# Patient Record
Sex: FEMALE | Race: WHITE | NOT HISPANIC OR LATINO | Employment: OTHER | URBAN - NONMETROPOLITAN AREA
[De-identification: names, ages, dates, MRNs, and addresses within clinical notes are randomized per-mention and may not be internally consistent; named-entity substitution may affect disease eponyms.]

---

## 2017-04-05 ENCOUNTER — OFFICE VISIT (OUTPATIENT)
Dept: DERMATOLOGY | Age: 82
End: 2017-04-05

## 2017-04-05 DIAGNOSIS — L82.1 SK (SEBORRHEIC KERATOSIS): Primary | ICD-10-CM

## 2017-04-05 PROCEDURE — 99213 OFFICE O/P EST LOW 20 MIN: CPT | Performed by: DERMATOLOGY

## 2017-04-05 RX ORDER — LACTOBACILLUS ACIDOPHILUS/PECT 30 MG-20MG
TABLET ORAL
COMMUNITY

## 2017-04-15 ENCOUNTER — RECORDS - HEALTHEAST (OUTPATIENT)
Dept: ADMINISTRATIVE | Facility: OTHER | Age: 82
End: 2017-04-15

## 2017-04-16 ENCOUNTER — RECORDS - HEALTHEAST (OUTPATIENT)
Dept: ADMINISTRATIVE | Facility: OTHER | Age: 82
End: 2017-04-16

## 2017-04-18 ENCOUNTER — RECORDS - HEALTHEAST (OUTPATIENT)
Dept: ADMINISTRATIVE | Facility: OTHER | Age: 82
End: 2017-04-18

## 2017-04-26 ENCOUNTER — RECORDS - HEALTHEAST (OUTPATIENT)
Dept: ADMINISTRATIVE | Facility: OTHER | Age: 82
End: 2017-04-26

## 2017-04-28 ENCOUNTER — RECORDS - HEALTHEAST (OUTPATIENT)
Dept: ADMINISTRATIVE | Facility: OTHER | Age: 82
End: 2017-04-28

## 2017-05-01 ENCOUNTER — RECORDS - HEALTHEAST (OUTPATIENT)
Dept: ADMINISTRATIVE | Facility: OTHER | Age: 82
End: 2017-05-01

## 2017-05-03 ENCOUNTER — RECORDS - HEALTHEAST (OUTPATIENT)
Dept: ADMINISTRATIVE | Facility: OTHER | Age: 82
End: 2017-05-03

## 2017-05-05 ENCOUNTER — OFFICE VISIT - HEALTHEAST (OUTPATIENT)
Dept: FAMILY MEDICINE | Facility: CLINIC | Age: 82
End: 2017-05-05

## 2017-05-05 ENCOUNTER — COMMUNICATION - HEALTHEAST (OUTPATIENT)
Dept: NURSING | Facility: CLINIC | Age: 82
End: 2017-05-05

## 2017-05-05 DIAGNOSIS — Z09 FOLLOW UP: ICD-10-CM

## 2017-05-05 DIAGNOSIS — I26.99 PULMONARY EMBOLISM (H): ICD-10-CM

## 2017-05-05 DIAGNOSIS — R60.9 EDEMA: ICD-10-CM

## 2017-05-05 DIAGNOSIS — K21.9 GERD (GASTROESOPHAGEAL REFLUX DISEASE): ICD-10-CM

## 2017-05-05 DIAGNOSIS — D47.Z2 CASTLEMAN DISEASE (H): ICD-10-CM

## 2017-05-05 DIAGNOSIS — E55.9 VITAMIN D DEFICIENCY: ICD-10-CM

## 2017-05-05 DIAGNOSIS — E03.9 HYPOTHYROIDISM: ICD-10-CM

## 2017-05-05 DIAGNOSIS — E78.5 HYPERLIPIDEMIA: ICD-10-CM

## 2017-05-05 DIAGNOSIS — I10 HYPERTENSION: ICD-10-CM

## 2017-05-05 DIAGNOSIS — A41.9 SEPSIS (H): ICD-10-CM

## 2017-05-05 DIAGNOSIS — M79.674 GREAT TOE PAIN, RIGHT: ICD-10-CM

## 2017-05-05 RX ORDER — LEVOTHYROXINE SODIUM 112 UG/1
225 TABLET ORAL DAILY
Status: SHIPPED | COMMUNITY
Start: 2017-05-05

## 2017-05-05 RX ORDER — FUROSEMIDE 40 MG
40 TABLET ORAL
Status: SHIPPED | COMMUNITY
Start: 2017-05-05

## 2017-05-05 RX ORDER — WARFARIN SODIUM 1 MG/1
3 TABLET ORAL SEE ADMIN INSTRUCTIONS
Status: SHIPPED | COMMUNITY
Start: 2017-05-05

## 2017-05-05 RX ORDER — CARVEDILOL 6.25 MG/1
3.12 TABLET ORAL 2 TIMES DAILY
Status: SHIPPED | COMMUNITY
Start: 2017-05-05

## 2017-05-05 RX ORDER — ACETAMINOPHEN 500 MG
1000 TABLET ORAL EVERY EVENING
Status: SHIPPED | COMMUNITY
Start: 2017-05-05

## 2017-05-08 ENCOUNTER — COMMUNICATION - HEALTHEAST (OUTPATIENT)
Dept: FAMILY MEDICINE | Facility: CLINIC | Age: 82
End: 2017-05-08

## 2017-05-08 DIAGNOSIS — I26.99 PULMONARY EMBOLISM (H): ICD-10-CM

## 2017-05-09 ENCOUNTER — COMMUNICATION - HEALTHEAST (OUTPATIENT)
Dept: FAMILY MEDICINE | Facility: CLINIC | Age: 82
End: 2017-05-09

## 2017-05-10 ENCOUNTER — RECORDS - HEALTHEAST (OUTPATIENT)
Dept: ADMINISTRATIVE | Facility: OTHER | Age: 82
End: 2017-05-10

## 2017-05-10 ENCOUNTER — COMMUNICATION - HEALTHEAST (OUTPATIENT)
Dept: FAMILY MEDICINE | Facility: CLINIC | Age: 82
End: 2017-05-10

## 2017-05-11 ENCOUNTER — OFFICE VISIT - HEALTHEAST (OUTPATIENT)
Dept: FAMILY MEDICINE | Facility: CLINIC | Age: 82
End: 2017-05-11

## 2017-05-11 ENCOUNTER — COMMUNICATION - HEALTHEAST (OUTPATIENT)
Dept: FAMILY MEDICINE | Facility: CLINIC | Age: 82
End: 2017-05-11

## 2017-05-11 ENCOUNTER — COMMUNICATION - HEALTHEAST (OUTPATIENT)
Dept: INTERNAL MEDICINE | Facility: CLINIC | Age: 82
End: 2017-05-11

## 2017-05-11 DIAGNOSIS — I26.99 PULMONARY EMBOLISM (H): ICD-10-CM

## 2017-05-12 ENCOUNTER — COMMUNICATION - HEALTHEAST (OUTPATIENT)
Dept: FAMILY MEDICINE | Facility: CLINIC | Age: 82
End: 2017-05-12

## 2017-05-16 ENCOUNTER — AMBULATORY - HEALTHEAST (OUTPATIENT)
Dept: FAMILY MEDICINE | Facility: CLINIC | Age: 82
End: 2017-05-16

## 2017-05-16 ENCOUNTER — RECORDS - HEALTHEAST (OUTPATIENT)
Dept: ADMINISTRATIVE | Facility: OTHER | Age: 82
End: 2017-05-16

## 2017-05-16 ENCOUNTER — COMMUNICATION - HEALTHEAST (OUTPATIENT)
Dept: FAMILY MEDICINE | Facility: CLINIC | Age: 82
End: 2017-05-16

## 2017-05-16 DIAGNOSIS — R60.9 EDEMA: ICD-10-CM

## 2017-05-22 ENCOUNTER — COMMUNICATION - HEALTHEAST (OUTPATIENT)
Dept: FAMILY MEDICINE | Facility: CLINIC | Age: 82
End: 2017-05-22

## 2017-05-22 ENCOUNTER — RECORDS - HEALTHEAST (OUTPATIENT)
Dept: ADMINISTRATIVE | Facility: OTHER | Age: 82
End: 2017-05-22

## 2017-05-24 ENCOUNTER — RECORDS - HEALTHEAST (OUTPATIENT)
Dept: ADMINISTRATIVE | Facility: OTHER | Age: 82
End: 2017-05-24

## 2017-05-31 ENCOUNTER — COMMUNICATION - HEALTHEAST (OUTPATIENT)
Dept: FAMILY MEDICINE | Facility: CLINIC | Age: 82
End: 2017-05-31

## 2017-05-31 ENCOUNTER — COMMUNICATION - HEALTHEAST (OUTPATIENT)
Dept: INTERNAL MEDICINE | Facility: CLINIC | Age: 82
End: 2017-05-31

## 2017-06-01 ENCOUNTER — AMBULATORY - HEALTHEAST (OUTPATIENT)
Dept: FAMILY MEDICINE | Facility: CLINIC | Age: 82
End: 2017-06-01

## 2017-06-02 ENCOUNTER — RECORDS - HEALTHEAST (OUTPATIENT)
Dept: ADMINISTRATIVE | Facility: OTHER | Age: 82
End: 2017-06-02

## 2017-06-23 ENCOUNTER — COMMUNICATION - HEALTHEAST (OUTPATIENT)
Dept: NURSING | Facility: CLINIC | Age: 82
End: 2017-06-23

## 2017-06-23 DIAGNOSIS — I26.99 PULMONARY EMBOLISM (H): ICD-10-CM

## 2017-09-19 ENCOUNTER — OFFICE VISIT (OUTPATIENT)
Dept: DERMATOLOGY | Age: 82
End: 2017-09-19

## 2017-09-19 DIAGNOSIS — L57.0 AK (ACTINIC KERATOSIS): Primary | ICD-10-CM

## 2017-09-19 PROCEDURE — 17000 DESTRUCT PREMALG LESION: CPT | Performed by: DERMATOLOGY

## 2017-10-19 ENCOUNTER — OFFICE VISIT (OUTPATIENT)
Dept: DERMATOLOGY | Age: 82
End: 2017-10-19

## 2017-10-19 DIAGNOSIS — L57.0 AK (ACTINIC KERATOSIS): Primary | ICD-10-CM

## 2017-10-19 PROCEDURE — 17003 DESTRUCT PREMALG LES 2-14: CPT | Performed by: DERMATOLOGY

## 2017-10-19 PROCEDURE — 17000 DESTRUCT PREMALG LESION: CPT | Performed by: DERMATOLOGY

## 2017-12-01 ENCOUNTER — RECORDS - HEALTHEAST (OUTPATIENT)
Dept: ADMINISTRATIVE | Facility: OTHER | Age: 82
End: 2017-12-01

## 2017-12-04 ENCOUNTER — RECORDS - HEALTHEAST (OUTPATIENT)
Dept: ADMINISTRATIVE | Facility: OTHER | Age: 82
End: 2017-12-04

## 2017-12-05 ENCOUNTER — COMMUNICATION - HEALTHEAST (OUTPATIENT)
Dept: LAB | Facility: CLINIC | Age: 82
End: 2017-12-05

## 2017-12-05 ENCOUNTER — RECORDS - HEALTHEAST (OUTPATIENT)
Dept: ADMINISTRATIVE | Facility: OTHER | Age: 82
End: 2017-12-05

## 2017-12-05 DIAGNOSIS — I26.99 PULMONARY EMBOLISM (H): ICD-10-CM

## 2017-12-06 ENCOUNTER — RECORDS - HEALTHEAST (OUTPATIENT)
Dept: ADMINISTRATIVE | Facility: OTHER | Age: 82
End: 2017-12-06

## 2017-12-08 ENCOUNTER — COMMUNICATION - HEALTHEAST (OUTPATIENT)
Dept: NURSING | Facility: CLINIC | Age: 82
End: 2017-12-08

## 2017-12-08 ENCOUNTER — AMBULATORY - HEALTHEAST (OUTPATIENT)
Dept: LAB | Facility: CLINIC | Age: 82
End: 2017-12-08

## 2017-12-08 DIAGNOSIS — I26.99 PULMONARY EMBOLISM (H): ICD-10-CM

## 2017-12-11 ENCOUNTER — RECORDS - HEALTHEAST (OUTPATIENT)
Dept: ADMINISTRATIVE | Facility: OTHER | Age: 82
End: 2017-12-11

## 2017-12-14 ENCOUNTER — COMMUNICATION - HEALTHEAST (OUTPATIENT)
Dept: INTERNAL MEDICINE | Facility: CLINIC | Age: 82
End: 2017-12-14

## 2017-12-14 ENCOUNTER — OFFICE VISIT - HEALTHEAST (OUTPATIENT)
Dept: FAMILY MEDICINE | Facility: CLINIC | Age: 82
End: 2017-12-14

## 2017-12-14 DIAGNOSIS — B33.8 RESPIRATORY SYNCYTIAL VIRUS: ICD-10-CM

## 2017-12-14 DIAGNOSIS — I26.99 PULMONARY EMBOLISM (H): ICD-10-CM

## 2017-12-14 DIAGNOSIS — E78.5 HYPERLIPIDEMIA: ICD-10-CM

## 2017-12-14 DIAGNOSIS — Z09 FOLLOW UP: ICD-10-CM

## 2017-12-14 RX ORDER — SIMVASTATIN 10 MG
10 TABLET ORAL AT BEDTIME
Qty: 30 TABLET | Refills: 0 | Status: SHIPPED | OUTPATIENT
Start: 2017-12-14

## 2017-12-15 ENCOUNTER — COMMUNICATION - HEALTHEAST (OUTPATIENT)
Dept: FAMILY MEDICINE | Facility: CLINIC | Age: 82
End: 2017-12-15

## 2017-12-18 ENCOUNTER — COMMUNICATION - HEALTHEAST (OUTPATIENT)
Dept: LAB | Facility: CLINIC | Age: 82
End: 2017-12-18

## 2017-12-18 DIAGNOSIS — I26.99 PULMONARY EMBOLISM (H): ICD-10-CM

## 2017-12-22 ENCOUNTER — AMBULATORY - HEALTHEAST (OUTPATIENT)
Dept: LAB | Facility: CLINIC | Age: 82
End: 2017-12-22

## 2017-12-22 ENCOUNTER — COMMUNICATION - HEALTHEAST (OUTPATIENT)
Dept: INTERNAL MEDICINE | Facility: CLINIC | Age: 82
End: 2017-12-22

## 2017-12-22 DIAGNOSIS — I26.99 PULMONARY EMBOLISM (H): ICD-10-CM

## 2018-01-11 ENCOUNTER — COMMUNICATION - HEALTHEAST (OUTPATIENT)
Dept: INTERNAL MEDICINE | Facility: CLINIC | Age: 83
End: 2018-01-11

## 2018-01-11 DIAGNOSIS — I26.99 PULMONARY EMBOLISM (H): ICD-10-CM

## 2018-03-03 ENCOUNTER — RECORDS - HEALTHEAST (OUTPATIENT)
Dept: ADMINISTRATIVE | Facility: OTHER | Age: 83
End: 2018-03-03

## 2018-03-06 ENCOUNTER — COMMUNICATION - HEALTHEAST (OUTPATIENT)
Dept: LAB | Facility: CLINIC | Age: 83
End: 2018-03-06

## 2018-03-07 ENCOUNTER — AMBULATORY - HEALTHEAST (OUTPATIENT)
Dept: LAB | Facility: CLINIC | Age: 83
End: 2018-03-07

## 2018-03-07 ENCOUNTER — OFFICE VISIT - HEALTHEAST (OUTPATIENT)
Dept: FAMILY MEDICINE | Facility: CLINIC | Age: 83
End: 2018-03-07

## 2018-03-07 DIAGNOSIS — R35.0 URINE FREQUENCY: ICD-10-CM

## 2018-03-07 DIAGNOSIS — I26.99 PULMONARY EMBOLISM (H): ICD-10-CM

## 2018-03-07 DIAGNOSIS — I10 ESSENTIAL HYPERTENSION: ICD-10-CM

## 2018-03-07 DIAGNOSIS — Z09 FOLLOW UP: ICD-10-CM

## 2018-03-07 DIAGNOSIS — R53.82 CHRONIC FATIGUE: ICD-10-CM

## 2018-03-07 DIAGNOSIS — N39.0 UTI (URINARY TRACT INFECTION): ICD-10-CM

## 2018-03-07 DIAGNOSIS — K21.9 GASTROESOPHAGEAL REFLUX DISEASE WITHOUT ESOPHAGITIS: ICD-10-CM

## 2018-03-07 LAB
ALBUMIN SERPL-MCNC: 3.1 G/DL (ref 3.5–5)
ALBUMIN UR-MCNC: NEGATIVE MG/DL
ALP SERPL-CCNC: 73 U/L (ref 45–120)
ALT SERPL W P-5'-P-CCNC: 20 U/L (ref 0–45)
ANION GAP SERPL CALCULATED.3IONS-SCNC: 8 MMOL/L (ref 5–18)
APPEARANCE UR: CLEAR
AST SERPL W P-5'-P-CCNC: 25 U/L (ref 0–40)
BACTERIA #/AREA URNS HPF: ABNORMAL HPF
BASOPHILS # BLD AUTO: 0.1 THOU/UL (ref 0–0.2)
BASOPHILS NFR BLD AUTO: 1 % (ref 0–2)
BILIRUB SERPL-MCNC: 0.5 MG/DL (ref 0–1)
BILIRUB UR QL STRIP: NEGATIVE
BUN SERPL-MCNC: 19 MG/DL (ref 8–28)
CALCIUM SERPL-MCNC: 9.4 MG/DL (ref 8.5–10.5)
CHLORIDE BLD-SCNC: 100 MMOL/L (ref 98–107)
CO2 SERPL-SCNC: 29 MMOL/L (ref 22–31)
COLOR UR AUTO: YELLOW
CREAT SERPL-MCNC: 0.82 MG/DL (ref 0.6–1.1)
EOSINOPHIL # BLD AUTO: 0.4 THOU/UL (ref 0–0.4)
EOSINOPHIL NFR BLD AUTO: 4 % (ref 0–6)
ERYTHROCYTE [DISTWIDTH] IN BLOOD BY AUTOMATED COUNT: 12.3 % (ref 11–14.5)
GFR SERPL CREATININE-BSD FRML MDRD: >60 ML/MIN/1.73M2
GLUCOSE BLD-MCNC: 90 MG/DL (ref 70–125)
GLUCOSE UR STRIP-MCNC: NEGATIVE MG/DL
HCT VFR BLD AUTO: 31.9 % (ref 35–47)
HGB BLD-MCNC: 11 G/DL (ref 12–16)
HGB UR QL STRIP: NEGATIVE
INR PPP: 2.6 (ref 0.9–1.1)
KETONES UR STRIP-MCNC: NEGATIVE MG/DL
LEUKOCYTE ESTERASE UR QL STRIP: ABNORMAL
LYMPHOCYTES # BLD AUTO: 2.8 THOU/UL (ref 0.8–4.4)
LYMPHOCYTES NFR BLD AUTO: 30 % (ref 20–40)
MCH RBC QN AUTO: 29.6 PG (ref 27–34)
MCHC RBC AUTO-ENTMCNC: 34.4 G/DL (ref 32–36)
MCV RBC AUTO: 86 FL (ref 80–100)
MONOCYTES # BLD AUTO: 0.9 THOU/UL (ref 0–0.9)
MONOCYTES NFR BLD AUTO: 10 % (ref 2–10)
NEUTROPHILS # BLD AUTO: 5.3 THOU/UL (ref 2–7.7)
NEUTROPHILS NFR BLD AUTO: 56 % (ref 50–70)
NITRATE UR QL: NEGATIVE
PH UR STRIP: 6 [PH] (ref 5–8)
PLATELET # BLD AUTO: 533 THOU/UL (ref 140–440)
PMV BLD AUTO: 6.8 FL (ref 7–10)
POTASSIUM BLD-SCNC: 4.4 MMOL/L (ref 3.5–5)
PROT SERPL-MCNC: 6.3 G/DL (ref 6–8)
RBC # BLD AUTO: 3.7 MILL/UL (ref 3.8–5.4)
RBC #/AREA URNS AUTO: ABNORMAL HPF
SODIUM SERPL-SCNC: 137 MMOL/L (ref 136–145)
SP GR UR STRIP: 1.01 (ref 1–1.03)
SQUAMOUS #/AREA URNS AUTO: ABNORMAL LPF
TSH SERPL DL<=0.005 MIU/L-ACNC: 9.8 UIU/ML (ref 0.3–5)
UROBILINOGEN UR STRIP-ACNC: ABNORMAL
WBC #/AREA URNS AUTO: ABNORMAL HPF
WBC: 9.5 THOU/UL (ref 4–11)

## 2018-03-07 ASSESSMENT — MIFFLIN-ST. JEOR: SCORE: 1086.76

## 2018-03-08 ENCOUNTER — COMMUNICATION - HEALTHEAST (OUTPATIENT)
Dept: FAMILY MEDICINE | Facility: CLINIC | Age: 83
End: 2018-03-08

## 2018-03-10 LAB — BACTERIA SPEC CULT: ABNORMAL

## 2018-03-12 ENCOUNTER — COMMUNICATION - HEALTHEAST (OUTPATIENT)
Dept: PEDIATRICS | Facility: CLINIC | Age: 83
End: 2018-03-12

## 2021-05-30 VITALS — WEIGHT: 156.6 LBS

## 2021-05-31 VITALS — WEIGHT: 155.4 LBS

## 2021-05-31 VITALS — WEIGHT: 150.7 LBS

## 2021-06-01 VITALS — BODY MASS INDEX: 28.95 KG/M2 | WEIGHT: 157.3 LBS | HEIGHT: 62 IN

## 2021-06-10 NOTE — PROGRESS NOTES
"  Subjective:      Kayla Agarwal is a 86 y.o. female who presents today with  and son in law for follow up, INR check  She does have history of Hypertension, Pulmonary Embolism, Hypothyroidism, Castleman's disease, unicentric, treated surgical 2016.   Notes she did have a PET scan last week and was normal.     Was seen in the ED at [facility] on 4/14/2017 for fever, weakness, confusion and was diagnosed with Sepsis - likely secondary to Group B strep, pharyngitis.    She was admitted at [facility], Medical ICU   And stayed there for 30  Hours in the ICU and transferred to a medical floor - was at the [facility] for overall one week. She was discharged from [facility] on 4/22/216 to [place], for rehabiliation.   She has been there for two weeks tomorrow. She will be discharged to [facility], [facility] in [place] tomorrow.    \She is from [place], [place] - [facility] if she can get home.    She needs a local provider if and until she can get home.   Has a follow up with [facility] 6/5, 6/6 and 6/7 - Internal Medicine, Hematology, ENT for CT for neck - enlarged lymph nodes\" re-examine off of abx. Ultrasound of spleen, PET scan showed enlarged spleen, u/s of abdomen -   All these tests are coming up.    She is also being followed by [facility] for weight loss, endorsed 40 pound weight loss over past year and not intentional  Noted history of hypertension, hyperlipidemia, PE, Hypothyroidism, GERD, Edema.     For medications she is taking,   Tylenol at night,     Coreg, for hypertension, well controlled   Denies side effects.   Lasix 20mg on AM on T, R, Sa, Sun and 40mg on MWF  Synthroid 225mcg, daily - unsure last thyroid check 3/3017  She is taking Prilosec 20mg - heart kenia, taken this for years and years.   Simvastin for hyperlipidemia - She is followed by Cardiology in [place], [place]    Taking Melatonin at night before bed  Vitamin D3 2,000iu once per day      Toe pain improved.   Presents today for INR " check for history of PE, chronic management  She states that they did complete an xray in the rehabilitation center and was normal.        Reviewed past medical/surgical history, family history, social history, medications and updated chart below.     No Known Allergies  No past medical history on file.  No past surgical history on file.  Social History     Social History     Marital status:      Spouse name: N/A     Number of children: N/A     Years of education: N/A     Occupational History     Not on file.     Social History Main Topics     Smoking status: Never Smoker     Smokeless tobacco: Never Used     Alcohol use Not on file     Drug use: Not on file     Sexual activity: Not on file     Other Topics Concern     Not on file     Social History Narrative     No family history on file.  Current Outpatient Prescriptions   Medication Sig Dispense Refill     carvedilol (COREG) 6.25 MG tablet Take 3.125 mg by mouth 2 (two) times a day.       furosemide (LASIX) 40 MG tablet Take 40 mg by mouth. Lasix 20 mg every morning on Tue, Thurs, Sat and Sunday. Lasix 40 mg on Monday, Wed and Friday.       levothyroxine (SYNTHROID, LEVOTHROID) 112 MCG tablet Take 225 mcg by mouth daily.       melatonin 1 mg Tab tablet Take 1 mg by mouth at bedtime as needed.       omeprazole (PRILOSEC) 20 MG capsule Take 20 mg by mouth Daily before breakfast.       simvastatin (ZOCOR) 10 MG tablet Take 10 mg by mouth at bedtime.       warfarin (COUMADIN) 1 MG tablet Take 1 mg by mouth See Admin Instructions. Coumadin 1 mg on Monday, Tuesday and Wednesday. Coumadin 1.5 mg on Saturday and Sunday.  Adjust dose based on INR results as directed.       acetaminophen (TYLENOL) 500 MG tablet Take 1,000 mg by mouth every evening.       cholecalciferol, vitamin D3, (VITAMIN D3) 2,000 unit cap Take 1 capsule by mouth daily.       cyanocobalamin 1000 MCG tablet Take 1,000 mcg by mouth daily.       No current facility-administered medications for this  visit.      Patient Active Problem List    Diagnosis Date Noted     Pulmonary embolism 05/05/2017     Hypertension 05/05/2017     Sepsis 05/05/2017     Hypothyroidism 05/05/2017     GERD (gastroesophageal reflux disease) 05/05/2017     Hyperlipidemia 05/05/2017     Edema 05/05/2017     Castleman disease 05/05/2017       Review of Systems   Constitutional: Negative.   HENT: Negative.   Eyes: Negative.   Respiratory: Negative.   Cardiovascular: Negative.   Gastrointestinal: Negative.   Endocrine: Negative.   Genitourinary: Negative.   Musculoskeletal: Negative.   Skin: Great toe red and painful  Allergic/Immunologic: Negative.   Neurological: Negative.   Hematological: Negative.   Psychiatric/Behavioral: Negative.     Objective:    Physical Exam   /72 (Patient Site: Left Arm, Patient Position: Sitting, Cuff Size: Adult Regular)  Wt 155 lb 6.4 oz (70.5 kg)    Constitutional: Alert and oriented x3, well nourished without any acute distress  Eyes: Conjunctivae and EOM are normal. Pupils are equal, round, and reactive to light. Right eye exhibits no discharge. Left eye exhibits no discharge.   Neck: No thyromegaly present.   Lymphadenopathy: Without palpable lymphadenopathy  Cardiovascular: Normal S1 and S2. Regular rate, rhythm and no murmur, rubs or gallops. Palpable distal pulses bilaterally.  Pulmonary/Chest: Normal effort. Lungs clear to auscultation in all lobes. Without wheezing, rhonchi or crackles.    Abdominal: Soft, non tenderness. There is no rebound or guarding. Bowel sounds x4. Without organomegaly. No CVA tenderness.  Musculoskeletal: 5/5 strength  And full ROM in all extremities. No joint swelling or deformity  Skin: Dry and intact; without rashes or lesions.  Left great toe erythematous  Psychiatric: Normal mood and affect.           Assessment/Plan:         Pulmonary embolism  History of PE, continue with INR Anticoagulation team.   Will follow up and adjust once lab received.   Reinforced side  effects of medications and proper use. Patient verbalized understanding.      Hypertension  Blood pressure well controlled.   Continue Coreg 6.25  Discussed parameters today and when to follow up.     Vitals:    05/11/17 1048   BP: 138/72       Follow up  Sepsis  I did review records from recent hospitalization and current rehabilitation center.   Followed up today with recent labs checked.   Continue with home PT/OT  Continue with already scheduled follow up appointments at [facility] for further imaging, testing.   Discussed red flags that would warrant urgent evaluation. Patient verbalized understanding.  Patient agreed and appeared pleased with plan    - Comprehensive Metabolic Panel  - HM1(CBC and Differential)  - HM1 (CBC with Diff)    Hypothyroidism  Recent thyroid labs checked during hospitalization.   Continue synthroid 112mcg.   Discussed with patient to follow up if worsening symptoms, questions or concerns  Patient agreed and appeared pleased with plan      GERD (gastroesophageal reflux disease)  Continue with PPI as already prescribed.     Hyperlipidemia  Continue with stable medication, Statin - Zocor 10mg.   Reinforced mark effects of medications and proper use. Patient verbalized understanding.  Patient agreed and appeared pleased with plan      Edema  Continue with Furosemide as already prescribed, recent normal CMP  Continue with compression stockings.     Castleman disease  Continue management at [facility]  Discussed with patient to follow up if worsening symptoms, questions or concerns  Patient agreed and appeared pleased with plan       Vitamin D deficiency  Will check level today, continue with 2,000iu of Vitamin D3 once per day.  Recent normal vitamin D level      Great toe pain, right  Improved! Follow up for persistent, worsening symptoms.   Kayla agreed with plan.     Weight loss,  Continue workup at [facility]  Will continue to log weight, diet.     She does plan to go back home to  [place], I did recommend follow up in 4 weeks if she is still living at her daughters home or sooner if needed  Patient and family agreed with plan.     Michelle Howard CNP  5/11/2017

## 2021-06-14 NOTE — PROGRESS NOTES
"  Subjective:      Kayla Agarwal is a 87 y.o. female who presents today with  and daughter, Concha, for follow up, INR check  She does have history of Hypertension, Pulmonary Embolism, Hypothyroidism, Castleman's disease, treated surgical 2016.   She lives and is from [place], [place]. Daughter states she did have cold symptoms and was not getting improved with treatment by her PCP in [place].   Was brought to [facility] on 12/4/2017 for evaluation and was admitted for two nights.   Discharged on 12/6/2017 with diagnosis of  RSV. She was discharged home with her daughter in [place].Daughter states she will be living with her for a couple months before she transitions home to [place] and her previous PCP.     She presents today for INR check, and management while she is living in [place].   Today Kayla states she feels \"fine\" She is using albuterol only when needed and tessalon perles while she continues to recover from her recent diagnosis of RSV.   She was discharged with steroids/prednisone and this is complete. Denies any chest pain, wheezing, shortness of breath, difficulty breathing.    Noted history of hypertension, hyperlipidemia, PE, Hypothyroidism, GERD, Edema.   Feels her medical history of stable. She is requesting a refill of her statin medication for one month today.   She is followed by Cardiologist in [place], [place].     Coreg, for hypertension, well controlled   Denies side effects.   Lasix 20mg on AM on T, R, Sa, Sun and 40mg on MWF  Synthroid 225mcg, daily - feels stable  She is taking Prilosec 20mg - heart kenia, taken this for years and years.       Reviewed past medical/surgical history, family history, social history, medications and updated chart below.     No Known Allergies  No past medical history on file.  No past surgical history on file.  Social History     Social History     Marital status:      Spouse name: N/A     Number of children: N/A     Years of education: N/A "     Occupational History     Not on file.     Social History Main Topics     Smoking status: Never Smoker     Smokeless tobacco: Never Used     Alcohol use Not on file     Drug use: Not on file     Sexual activity: Not on file     Other Topics Concern     Not on file     Social History Narrative     No family history on file.  Current Outpatient Prescriptions   Medication Sig Dispense Refill     acetaminophen (TYLENOL) 500 MG tablet Take 1,000 mg by mouth every evening.       carvedilol (COREG) 6.25 MG tablet Take 3.125 mg by mouth 2 (two) times a day.       cholecalciferol, vitamin D3, (VITAMIN D3) 2,000 unit cap Take 1 capsule by mouth daily.       cyanocobalamin 1000 MCG tablet Take 1,000 mcg by mouth daily.       furosemide (LASIX) 40 MG tablet Take 40 mg by mouth. Lasix 20 mg every morning on Tue, Thurs, Sat and Sunday. Lasix 40 mg on Monday, Wed and Friday.       levothyroxine (SYNTHROID, LEVOTHROID) 112 MCG tablet Take 225 mcg by mouth daily.       melatonin 1 mg Tab tablet Take 1 mg by mouth at bedtime as needed.       omeprazole (PRILOSEC) 20 MG capsule Take 20 mg by mouth Daily before breakfast.       simvastatin (ZOCOR) 10 MG tablet Take 1 tablet (10 mg total) by mouth at bedtime. 30 tablet 0     warfarin (COUMADIN) 1 MG tablet Take 1 mg by mouth See Admin Instructions. Coumadin 1 mg on Monday, Tuesday and Wednesday. Coumadin 1.5 mg on Saturday and Sunday.  Adjust dose based on INR results as directed.       No current facility-administered medications for this visit.      Patient Active Problem List    Diagnosis Date Noted     Pulmonary embolism 05/05/2017     Hypertension 05/05/2017     Sepsis 05/05/2017     Hypothyroidism 05/05/2017     GERD (gastroesophageal reflux disease) 05/05/2017     Hyperlipidemia 05/05/2017     Edema 05/05/2017     Castleman disease 05/05/2017       Review of Systems   Constitutional: Follow up [place] inpatient - RSV  HENT: Negative.   Eyes: Negative.   Respiratory: Negative.    Cardiovascular: Negative.   Gastrointestinal: Negative.   Endocrine: Negative.   Genitourinary: Negative.   Musculoskeletal: Negative.   Skin: Neagtive  Allergic/Immunologic: Negative.   Neurological: Negative.   Hematological: Negative.   Psychiatric/Behavioral: Negative.     Objective:    Physical Exam   /62 (Patient Site: Left Arm, Patient Position: Sitting, Cuff Size: Adult Regular)  Pulse 68  Wt 150 lb 11.2 oz (68.4 kg)  SpO2 96%    Constitutional: Alert and oriented x3, well nourished without any acute distress  Eyes: Conjunctivae and EOM are normal. Pupils are equal, round, and reactive to light. Right eye exhibits no discharge. Left eye exhibits no discharge.   Neck: No thyromegaly present.   Lymphadenopathy: Without palpable lymphadenopathy  Cardiovascular: Normal S1 and S2. Regular rate, rhythm and no murmur, rubs or gallops. Palpable distal pulses bilaterally.  Pulmonary/Chest: Normal effort. Lungs clear to auscultation in all lobes. Without wheezing, rhonchi or crackles.    Abdominal: Soft, non tenderness. There is no rebound or guarding. Bowel sounds x4. Without organomegaly. No CVA tenderness.  Musculoskeletal: 5/5 strength  And full ROM in all extremities. No joint swelling or deformity  Skin: Dry and intact; without rashes or lesions.  Left great toe erythematous  Psychiatric: Normal mood and affect.           Assessment/Plan:        1. Pulmonary embolism  History of PE, continue with INR Anticoagulation team.   Will follow up and adjust once lab received.   Reinforced side effects of medications and proper use. Patient and daughter verbalized understanding.  - Ambulatory referral to Anticoagulation Monitoring    2. Hyperlipidemia  I will get one month refill until they return to previous PCP.   Reinforced side effects of medication.   - simvastatin (ZOCOR) 10 MG tablet; Take 1 tablet (10 mg total) by mouth at bedtime.  Dispense: 30 tablet; Refill: 0    3. Follow up  4. Respiratory  syncytial virus  She is scheduled for a chest xray in two weeks.   I will follow up with labs once received.   Continue with medication as prescribed by [facility] - tessalon Perles. She is done with her steroids and lung sounds improved and clear today.   Continue with albuterol as prescribed and as needed.   LCTA and oxygen 96 % on room air. Kayla is feeling her baseline.   Discussed with patient to follow up if worsening symptoms, questions or concerns  Discussed red flags that would warrant urgent evaluation. Patient verbalized understanding.    - HM1(CBC and Differential)  - Comprehensive Metabolic Panel  - INR  - HM1 (CBC with Diff)      5. Hypertension  Blood pressure well controlled.   Continue already established home regimen  Discussed parameters today and when to follow up.     Vitals:    12/14/17 1434   BP: 134/62   Pulse:    SpO2:        She does plan to go back home to [place], I did recommend follow up in 4 weeks if she is still living at her daughters home or sooner if needed  Patient and family agreed with plan.     Michelle Howard, KENNETH  12/15/2017

## 2021-06-15 PROBLEM — E78.5 HYPERLIPIDEMIA: Status: ACTIVE | Noted: 2017-05-05

## 2021-06-15 PROBLEM — I10 HYPERTENSION: Status: ACTIVE | Noted: 2017-05-05

## 2021-06-15 PROBLEM — K21.9 GERD (GASTROESOPHAGEAL REFLUX DISEASE): Status: ACTIVE | Noted: 2017-05-05

## 2021-06-15 PROBLEM — D47.Z2 CASTLEMAN DISEASE (H): Status: ACTIVE | Noted: 2017-05-05

## 2021-06-15 PROBLEM — E03.9 HYPOTHYROIDISM: Status: ACTIVE | Noted: 2017-05-05

## 2021-06-15 PROBLEM — A41.9 SEPSIS (H): Status: ACTIVE | Noted: 2017-05-05

## 2021-06-15 PROBLEM — I26.99 PULMONARY EMBOLISM (H): Status: ACTIVE | Noted: 2017-05-05

## 2021-06-15 PROBLEM — R60.9 EDEMA: Status: ACTIVE | Noted: 2017-05-05

## 2021-06-16 NOTE — PROGRESS NOTES
Assessment/plan   Kayla Agarwal is a 87 y.o. female who is New patient to my practice here with   Chief Complaint   Patient presents with     Hospital Visit Follow Up     f/u from [place], [facility] 3/3/18; viral infection; [place] would like urine and certain labs     INR Check     Headache     since 3/5/18; fatigue ongoing      Wrist Problem     swelling in left forearm since 3/6/18; lump in same arm, noticed 2/5/18        Kayla was seen today for hospital visit follow up, inr check, headache and wrist problem.    Diagnoses and all orders for this visit:    Follow up hospital  Comments:  was admitted     Essential hypertension    Pulmonary embolism  -     INR    Gastroesophageal reflux disease without esophagitis    Chronic fatigue  -     Comprehensive Metabolic Panel  -     Thyroid Stimulating Hormone (TSH)  -     HM1(CBC and Differential)  -     HM1 (CBC with Diff)    Urine frequency  -     Urinalysis      Subjective:      HPI: Kayla Agarwal is a 87 y.o. female  Who recently seen at 3/3/18 at [facility] for viral URI was admitted for 2 days was diagnosed with delirium secondary to acute viral illness. Had bronchoscopy and CT scan   And also showed dropping hemoglobin . Main concern for today's visit was headache for last 5 days and as she is on coumadin it was recommended to have check at the clinic to rule out any stroke and neurologic exam , Patient denies any headache today but it was back of her neck and head and was causing tight band around her head , range of motion was intact mild neck stiffness  Away from her home in [place] for last 2 month , with 2 inpatinet visits at [facility] and lot of labs and testing during that time . D/C summey and labs were reviewed during the office visit   Denies urine frequency burning. No  No  fever chills sweat, chest pain, shortness of breath , nausea ,diarrhea, or vomiting      New concern of Right forearm and wrist swelling no pain , patient does have lower extremities swelling and does take lasix  every day. range of motion at the wrist intact , does have history of carpal tunnel syndrome surgery both wrist     History  :  Patient with significant  history of Hypertension, Pulmonary Embolism, Hypothyroidism, Castleman's disease, treated surgical 2016.   She lives and is from [place], [place]. First time she Was brought to [facility] on 12/4/2017 for evaluation and was admitted for two nights.   Discharged on 12/6/2017 with diagnosis of  RSV. She was discharged home with her daughter in [place].Daughter states she will be living with her for a couple months before she transitions home to [place] and her previous PCP.           No past medical history on file.  No past surgical history on file.  Review of patient's allergies indicates no known allergies.  Current Outpatient Prescriptions   Medication Sig Dispense Refill     acetaminophen (TYLENOL) 500 MG tablet Take 1,000 mg by mouth every evening.       carvedilol (COREG) 6.25 MG tablet Take 3.125 mg by mouth 2 (two) times a day.       cholecalciferol, vitamin D3, (VITAMIN D3) 2,000 unit cap Take 1 capsule by mouth daily.       cyanocobalamin 1000 MCG tablet Take 1,000 mcg by mouth daily.       furosemide (LASIX) 40 MG tablet Take 40 mg by mouth. Lasix 20 mg every morning on Tue, Thurs, Sat and Sunday. Lasix 40 mg on Monday, Wed and Friday.       levothyroxine (SYNTHROID, LEVOTHROID) 112 MCG tablet Take 225 mcg by mouth daily.       melatonin 1 mg Tab tablet Take 1 mg by mouth at bedtime as needed.       omeprazole (PRILOSEC) 20 MG capsule Take 20 mg by mouth Daily before breakfast.       simvastatin (ZOCOR) 10 MG tablet Take 1 tablet (10 mg total) by mouth at bedtime. 30 tablet 0     warfarin (COUMADIN) 1 MG tablet Take 3 mg by mouth See Admin Instructions. Coumadin 1 mg on Monday, Tuesday and Wednesday. Coumadin 1.5 mg on Saturday and Sunday.  Adjust dose based on INR results as directed.        No current facility-administered medications for this  "visit.      No family history on file.    Patient Active Problem List   Diagnosis     Pulmonary embolism     Hypertension     Sepsis     Hypothyroidism     GERD (gastroesophageal reflux disease)     Hyperlipidemia     Edema     Castleman disease       Review of Systems  Pertinent ROS noted in HPI    Social History     Social History Narrative       Objective:     Vitals:    03/07/18 1010   BP: 126/52   Pulse: 62   Temp: 97.8  F (36.6  C)   Weight: 157 lb 4.8 oz (71.4 kg)   Height: 5' 2\" (1.575 m)       Physical Exam:     General: Alert, no acute distress. Walk with the help of walker no recent fall   HEENT: normocephalic conjunctivae are clear, Normal pearly TMs bilaterally without erythema, pus or fluid. Position and landmarks are normal.  Nose is clear.  Oropharynx is moist and clear, without tonsillar hypertrophy, asymmetry, exudate or lesions.  Neck: supple without adenopathy or thyromegaly.  Lungs: Good aeration bilaterally. No prolongation of expiratory phase.   No tachypnea, retractions, or increased work of breathing. Clear to auscultation without wheezes, rales or rhonci.    Heart: regular rate and rhythm, normal S1 and S2, no murmurs  Abdomen: soft and nontender, bowel sounds are present, no hepatosplenomegaly or mass palpable.  Skin: clear without rash or lesions   upper extremities : swelling of wrist and forearm . Good range of motion no tenderness no bruise  ( ?? Blood clot , patient already on coumadin so ultrasound not done )  Neuro: alert, interactive moving all extremities equally, normal muscle tone in all 4 extremities, deep tendon reflexes 2+ symmetrically at the patella      I spent 40 minutes with the patient, >50% of which was in counseling regarding the patient's medical issues as noted above.  Mandie Holder MD    Patient Instructions   Dear Kayla    It was a pleasure to see you in clinic today. Should you have any questions or concerns, my assistant is Yuridia / and care coordinator Marcela and " they  can be reached directly at [phone]    Plan discussed at this visit : we will plan to do the labs and will Follow up as they are available   I will see if our INR nurse will mange the coumadin dose till you are here.  Swelling on the left arm , please try warm water soak with EPSOM salt at least once day .  No change in dose of any medication today yet , will change if needed after the blood test result.   I will recommend to be seen by neurologist to eval the memory issues   On my exam no sign of stroke.   For headache back the neck try stretches and ice and may be peppermint oil message.    Keep the appointment at [facility] 3/14 for routine check and Follow up with all the specialist     If you are having any lab work or tests done, our office will contact you with those results in your preferred method of communication.    Feel free to call for any concerns or questions or send us My chart message     Mandie Holder MD

## 2021-07-03 NOTE — ADDENDUM NOTE
Addendum Note by Mandie Holder MD at 3/11/2018  6:20 PM     Author: Mandie Holder MD Service: -- Author Type: Physician    Filed: 3/11/2018  6:20 PM Encounter Date: 3/7/2018 Status: Signed    : Mandie Holder MD (Physician)    Addended by: MANDIE HOLDER on: 3/11/2018 06:20 PM        Modules accepted: Orders

## 2021-07-03 NOTE — ADDENDUM NOTE
Addendum Note by Birgit Latham MLT at 3/7/2018 12:53 PM     Author: Birgit Latham MLT Service: -- Author Type:     Filed: 3/7/2018 12:53 PM Encounter Date: 3/7/2018 Status: Signed    : Birgit Latham MLT ()    Addended by: BIRGIT LATHAM on: 3/7/2018 12:53 PM        Modules accepted: Orders

## 2021-08-21 ENCOUNTER — HEALTH MAINTENANCE LETTER (OUTPATIENT)
Age: 86
End: 2021-08-21

## 2021-10-16 ENCOUNTER — HEALTH MAINTENANCE LETTER (OUTPATIENT)
Age: 86
End: 2021-10-16

## 2022-09-25 ENCOUNTER — HEALTH MAINTENANCE LETTER (OUTPATIENT)
Age: 87
End: 2022-09-25

## 2023-10-14 ENCOUNTER — HEALTH MAINTENANCE LETTER (OUTPATIENT)
Age: 88
End: 2023-10-14